# Patient Record
(demographics unavailable — no encounter records)

---

## 2025-01-16 NOTE — ASSESSMENT
[FreeTextEntry1] : Flu-like symptoms without real c/f pna, sepsis, PE, etc.  No indication for ED eval

## 2025-01-16 NOTE — HISTORY OF PRESENT ILLNESS
[Home] : at home, [unfilled] , at the time of the visit. [Other Location: e.g. Home (Enter Location, City,State)___] : at [unfilled] [Verbal consent obtained from patient] : the patient, [unfilled] [FreeTextEntry8] : 65 y F flu-like symptoms x 1 wk, sore throat, mucous, lower back pain, cough Primarily concerned about postnasal drip and congestion No CP, no pleuritic symptoms, lightheadedness, no SOB/exertional dyspnea Mucinex, not really helping +recent travel to [place]  with same symptoms Allergic to epinephrine

## 2025-01-16 NOTE — PHYSICAL EXAM
[de-identified] : PLEASE SEE HPI FOR ADDITIONAL RELEVANT PHYSICAL EXAM FINDINGS GENERAL: no acute distress, nontoxic HEAD: normocephalic EYES: no scleral icterus NECK: trachea midline, Full ROM RESP: no respiratory distress ABD: nondistended MSK: no gross deformity NEURO: alert & fully oriented SKIN: no rash PSYCH: cooperative, good insight, appropriate, fluent speech